# Patient Record
Sex: MALE | Race: WHITE | NOT HISPANIC OR LATINO | Employment: FULL TIME | URBAN - METROPOLITAN AREA
[De-identification: names, ages, dates, MRNs, and addresses within clinical notes are randomized per-mention and may not be internally consistent; named-entity substitution may affect disease eponyms.]

---

## 2024-06-27 ENCOUNTER — OFFICE VISIT (OUTPATIENT)
Dept: URGENT CARE | Facility: CLINIC | Age: 44
End: 2024-06-27
Payer: COMMERCIAL

## 2024-06-27 VITALS
SYSTOLIC BLOOD PRESSURE: 130 MMHG | RESPIRATION RATE: 17 BRPM | WEIGHT: 170 LBS | OXYGEN SATURATION: 97 % | DIASTOLIC BLOOD PRESSURE: 82 MMHG | BODY MASS INDEX: 32.1 KG/M2 | HEIGHT: 61 IN | HEART RATE: 57 BPM

## 2024-06-27 DIAGNOSIS — M25.511 PERISCAPULAR PAIN OF RIGHT SHOULDER: Primary | ICD-10-CM
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
## 2024-06-27 DIAGNOSIS — M75.51 BURSITIS OF RIGHT SHOULDER: ICD-10-CM

## 2024-06-27 DIAGNOSIS — S46.911A OVERUSE SYNDROME OF SHOULDER, RIGHT, INITIAL ENCOUNTER: ICD-10-CM

## 2024-06-27 PROCEDURE — 99212 OFFICE O/P EST SF 10 MIN: CPT | Performed by: PHYSICIAN ASSISTANT

## 2024-06-27 PROCEDURE — 99417 PROLNG OP E/M EACH 15 MIN: CPT | Performed by: PHYSICIAN ASSISTANT

## 2024-06-27 RX ORDER — PREDNISONE 50 MG/1
50 TABLET ORAL DAILY
Qty: 7 TABLET | Refills: 0 | Status: SHIPPED | OUTPATIENT
Start: 2024-06-27 | End: 2024-07-04

## 2024-06-27 NOTE — PROGRESS NOTES
[facility] URGENT CARE   SOFIYA NOTE:      Name: Prateek Sutton, 44 y.o.    CSN:[id number]   MRN:[id number]    PCP: No primary care provider on file.    ALL:  No Known Allergies    History:    Chief Complaint: Back Pain (Pain in lower back to mid back, pt states right shoulder pain as well.)    Encounter Date: 6/27/2024      HPI: The history was obtained from the patient and spouse . Prateek is a 44 y.o. male, who presents with a chief complaint of Back Pain (Pain in lower back to mid back, pt states right shoulder pain as well.)  He is having right periscapular back discomfort as well as shoulder discomfort with radiation into the right shoulder and bicep, he is get some right arm weakness, this is ongoing throughout the summer mostly improved with rest, he is taking turmeric which is also helped he had a massage with cupping performed which was modestly helpful and some use of ibuprofen which was okay as well.  He denies any paresthesias, neck pain, but does note that there is some stiffness in his right neck limiting his overall range of motion and movement.  Last week he did take 3 days off this improved mostly all of the symptoms he returned to work and lifted a couple of heavy items and now is get repeated pain.    PMHx:    No past medical history on file.           Current Outpatient Medications   Medication Sig Dispense Refill    predniSONE (Deltasone) 50 mg tablet Take 1 tablet (50 mg) by mouth once daily for 7 days. 7 tablet 0     No current facility-administered medications for this visit.         PMSx:  No past surgical history on file.    Fam Hx: No family history on file.    SOC. Hx:     Social History     Socioeconomic History    Marital status:      Spouse name: Not on file    Number of children: Not on file    Years of education: Not on file    Highest education level: Not on file   Occupational History    Not on file   Tobacco Use    Smoking status: Not on file     Smokeless tobacco: Not on file   Substance and Sexual Activity    Alcohol use: Not on file    Drug use: Not on file    Sexual activity: Not on file   Other Topics Concern    Not on file   Social History Narrative    Not on file     Social Determinants of Health     Financial Resource Strain: Not on file   Food Insecurity: Not on file   Transportation Needs: Not on file   Physical Activity: Not on file   Stress: Not on file   Social Connections: Not on file   Intimate Partner Violence: Not on file   Housing Stability: Not on file         Vitals:    06/27/24 1715   BP: 130/82   Pulse: 57   Resp: 17   SpO2: 97%     77.1 kg (170 lb)          Physical Exam  Vitals reviewed.   Constitutional:       Appearance: Normal appearance. He is normal weight.   HENT:      Head: Normocephalic and atraumatic.      Nose: Nose normal.      Mouth/Throat:      Mouth: Mucous membranes are moist.   Eyes:      Extraocular Movements: Extraocular movements intact.   Cardiovascular:      Rate and Rhythm: Normal rate and regular rhythm.   Pulmonary:      Effort: Pulmonary effort is normal.      Breath sounds: Normal breath sounds.   Abdominal:      General: Abdomen is flat.   Musculoskeletal:         General: Normal range of motion.      Cervical back: Normal range of motion and neck supple.        Back:    Skin:     General: Skin is warm.   Neurological:      Mental Status: He is alert and oriented to person, place, and time.   Psychiatric:         Behavior: Behavior normal.           LABORATORY @ RADIOLOGICAL IMAGING (if done):     At this point no x-rays are needed as patient has no limitations of oral movement of right upper arm.    ____________________________________________________________________    I did personally review Prateek's past medical history, surgical history, social history, as well as family history (when relevant).  In this case, I also oversaw the his drug management by reviewing his medication list, allergy list, as well  as the medications that I prescribed during the UC course and/or recommended as an out-patient (including possible OTC medications such as acetaminophen, NSAIDs , etc).    After reviewing the items above, I did not look at previous medical documentation, such as recent hospitalizations, office visits, and/or recent consultations with PCP/specialist.                          SDOH:   Another factor that I considered in Prateek's care was his Social Determinants of Health (SDOH). During this UC encounter, he did not have social determinants of health. Those SDOH influencing Prateek's care are: none      _____________________________________________________________________      UC COURSE/MEDICAL DECISION MAKING:    Prateek is a 44 y.o., who presents with a working diagnosis of   1. Periscapular pain of right shoulder    2. Bursitis of right shoulder    3. Overuse syndrome of shoulder, right, initial encounter     with a differential to include: Sprain, strain, contusion, fracture, avulsion fracture, dislocation, tendinitis, tenosynovitis    Patient suffering from right shoulder overuse syndrome that could be causing some bursitis, we discussed treatment plan to include ice rest applications of topical muscle rubs or balm's, discussed use of stretching activities with exercise band, he is interested in the thought of using prednisone, I did review the overall goal of treatment and potential side effects associated with this, did  the patient at bedside is important to create time to limber up and stretch as best he can before his job-related duties and/or extracurricular assignments.  I offered him a number of options in the form of how to pursue this, and he seemed amenable to it.      Paul Bowling PA-C   Advanced Practice Provider  [facility] URGENT [facility]ARE

## 2024-06-27 NOTE — LETTER
June 27, 2024     Patient: Prateek Sutton   YOB: 1980   Date of Visit: 6/27/2024       To Whom It May Concern:    It is my medical opinion that Prateek Sutton may return to work on 07/01/24 .    If you have any questions or concerns, please don't hesitate to call.         Sincerely,        Paul Bowling PA-C    CC: No Recipients